# Patient Record
Sex: FEMALE | Race: WHITE | Employment: UNEMPLOYED | URBAN - METROPOLITAN AREA
[De-identification: names, ages, dates, MRNs, and addresses within clinical notes are randomized per-mention and may not be internally consistent; named-entity substitution may affect disease eponyms.]

---

## 2018-10-10 PROCEDURE — 88175 CYTOPATH C/V AUTO FLUID REDO: CPT | Performed by: OBSTETRICS & GYNECOLOGY

## 2018-12-17 PROBLEM — O10.919 CHRONIC HYPERTENSION DURING PREGNANCY, ANTEPARTUM: Status: ACTIVE | Noted: 2017-08-03

## 2018-12-17 PROBLEM — Z28.3 MATERNAL VARICELLA, NON-IMMUNE: Status: ACTIVE | Noted: 2017-01-05

## 2018-12-17 PROBLEM — E66.9 OBESITY: Status: ACTIVE | Noted: 2017-01-05

## 2018-12-17 PROBLEM — O09.899 MATERNAL VARICELLA, NON-IMMUNE: Status: ACTIVE | Noted: 2017-01-05

## 2019-01-02 PROBLEM — O34.219 MATERNAL CARE FOR SCAR FROM PREVIOUS CESAREAN DELIVERY, UNSPECIFIED PRIOR CESAREAN DELIVERY TYPE: Status: ACTIVE | Noted: 2019-01-02

## 2019-01-02 PROCEDURE — 88175 CYTOPATH C/V AUTO FLUID REDO: CPT | Performed by: OBSTETRICS & GYNECOLOGY

## 2019-01-03 PROCEDURE — 86762 RUBELLA ANTIBODY: CPT | Performed by: OBSTETRICS & GYNECOLOGY

## 2019-01-03 PROCEDURE — 86901 BLOOD TYPING SEROLOGIC RH(D): CPT | Performed by: OBSTETRICS & GYNECOLOGY

## 2019-01-03 PROCEDURE — 86780 TREPONEMA PALLIDUM: CPT | Performed by: OBSTETRICS & GYNECOLOGY

## 2019-01-03 PROCEDURE — 86850 RBC ANTIBODY SCREEN: CPT | Performed by: OBSTETRICS & GYNECOLOGY

## 2019-01-03 PROCEDURE — 86900 BLOOD TYPING SEROLOGIC ABO: CPT | Performed by: OBSTETRICS & GYNECOLOGY

## 2019-01-03 PROCEDURE — 85305 CLOT INHIBIT PROT S TOTAL: CPT | Performed by: OBSTETRICS & GYNECOLOGY

## 2019-01-03 PROCEDURE — 87389 HIV-1 AG W/HIV-1&-2 AB AG IA: CPT | Performed by: OBSTETRICS & GYNECOLOGY

## 2019-01-03 PROCEDURE — 85306 CLOT INHIBIT PROT S FREE: CPT | Performed by: OBSTETRICS & GYNECOLOGY

## 2019-01-07 NOTE — PROGRESS NOTES
Outpatient Maternal-Fetal Medicine Consultation    Dear Dr. Hue Benoit,    Thank you for requesting ultrasound evaluation and maternal fetal medicine consultation on your patient Shannen Benson.   As you are aware she is a 34year old female with a [place] preg mouth., Disp: , Rfl:   •  Labetalol HCl 100 MG Oral Tab, Take 1 tablet (100 mg total) by mouth 3 (three) times daily. , Disp: 90 tablet, Rfl: 3  •  Albuterol Sulfate  (90 Base) MCG/ACT Inhalation Aero Soln, Inhale 2 puffs into the lungs. , Disp: , Rfl Currently non-invasive pregnancy testing (NIPT) offers the highest detection rate (with the lowest false positive rate) for the detection of fetal aneuploidy amongst high-risk patients.  The limitations of detailed mid-trimester sonography was reviewed with resistance. Due to its strong association with obesity in the general population, type 2 diabetes mellitus is one of the two most common medical complications of the obese .  The increased risk of type 2 diabetes is primarily relate may increase with increasing maternal weight. The risk of neural tube defects increased significantly with maternal weight. The analysis found that overweight and obese pregnant women experienced significantly more stillbirths than normal weight women. 24 hours after delivery. She did not need to go home on labetalol after delivery. She was currently started on labetalol at 8 weeks gestation due to elevated blood pressure.   She now has a diagnosis of chronic hypertension.     Background  Preeclampsia approximately one-third developed the disease at ? 27 weeks, one-third at 28 to 36 weeks, and one-third at ? 37 weeks. Thus, 21 percent of subsequent pregnancies were complicated by severe preeclampsia in the second trimester.   Recurrent preeclampsia is more preeclampsia.  Measurement of angiogenic factors (VEGF, PlGF, sFlt-1, Kandace) in blood or urine is the most promising approach for predicting preeclampsia; however, these tests are investigational and are not available for clinical use at present.           maintaining vascular wall prostacyclin synthesis. Although not well studied, the beneficial effect of low-dose aspirin for prevention of preeclampsia may also be in part related to modulation of inflammation.  The drug has been studied for both prevention o development of preeclampsia vary widely and lack consistency. USPSTF criteria for moderate risk include:  · Nulliparity  · Obesity (body mass index >30 kg/m2)  · Family history of preeclampsia in mother or sister  · Age ? 35 years  · Sociodemographic charac clinical disease is apparent. However, available evidence is inconsistent about the importance of early initiation of therapy, possibly because aspirin has major effects on prostacyclin production and endothelial function throughout gestation.   Aspirin is preeclampsia of at least 8 percent: Women with ?1 high-risk factors should receive low-dose aspirin.  For women with multiple moderate risk factors, the benefit of aspirin therapy is less clear so clinicians should use clinical judgement and talk with their or two or more lower risk factors.       HYPERTENSION  Patient Review  The patient has a history of hypertension since December 2018.   She is controlled on Labetalol 100 mg TID.    Discussion:  We discussed the potential implications associated with chroni as well as recommendations for serial growth ultrasounds and antepartum testing.       IMPRESSION:  · IUP at 11w5d  · Morbid obesity complicating pregnancy  · Chronic hypertension  · H/o Preeclampisa  · H/o   · Family h/o protein S deficiency; her

## 2019-01-09 ENCOUNTER — OFFICE VISIT (OUTPATIENT)
Dept: PERINATAL CARE | Facility: HOSPITAL | Age: 34
End: 2019-01-09
Attending: OBSTETRICS & GYNECOLOGY
Payer: COMMERCIAL

## 2019-01-09 VITALS
HEART RATE: 76 BPM | WEIGHT: 293 LBS | BODY MASS INDEX: 51.91 KG/M2 | DIASTOLIC BLOOD PRESSURE: 81 MMHG | SYSTOLIC BLOOD PRESSURE: 136 MMHG | HEIGHT: 63 IN

## 2019-01-09 DIAGNOSIS — O09.299 HX OF PREECLAMPSIA, PRIOR PREGNANCY, CURRENTLY PREGNANT: ICD-10-CM

## 2019-01-09 DIAGNOSIS — J45.30 MILD PERSISTENT ASTHMA WITHOUT COMPLICATION: ICD-10-CM

## 2019-01-09 DIAGNOSIS — O10.919 CHRONIC HYPERTENSION DURING PREGNANCY, ANTEPARTUM: ICD-10-CM

## 2019-01-09 DIAGNOSIS — E66.01 MORBID OBESITY WITH BMI OF 50.0-59.9, ADULT (HCC): ICD-10-CM

## 2019-01-09 DIAGNOSIS — O09.621 HIGH-RISK PREGNANCY, YOUNG MULTIGRAVIDA IN FIRST TRIMESTER: Primary | ICD-10-CM

## 2019-01-09 DIAGNOSIS — O34.219 MATERNAL CARE FOR SCAR FROM PREVIOUS CESAREAN DELIVERY, UNSPECIFIED PRIOR CESAREAN DELIVERY TYPE: ICD-10-CM

## 2019-01-09 PROCEDURE — 99244 OFF/OP CNSLTJ NEW/EST MOD 40: CPT | Performed by: OBSTETRICS & GYNECOLOGY

## 2019-01-12 PROBLEM — R87.612 PAPANICOLAOU SMEAR OF CERVIX WITH LOW GRADE SQUAMOUS INTRAEPITHELIAL LESION (LGSIL): Status: ACTIVE | Noted: 2019-01-12

## 2019-01-12 PROCEDURE — 82105 ALPHA-FETOPROTEIN SERUM: CPT | Performed by: OBSTETRICS & GYNECOLOGY

## 2019-01-12 PROCEDURE — 84156 ASSAY OF PROTEIN URINE: CPT | Performed by: OBSTETRICS & GYNECOLOGY

## 2019-01-12 PROCEDURE — 82570 ASSAY OF URINE CREATININE: CPT | Performed by: OBSTETRICS & GYNECOLOGY

## 2019-03-28 PROBLEM — O26.899 PELVIC CRAMPING IN ANTEPARTUM PERIOD: Status: ACTIVE | Noted: 2019-03-28

## 2019-03-28 PROBLEM — R10.2 PELVIC CRAMPING IN ANTEPARTUM PERIOD: Status: ACTIVE | Noted: 2019-03-28

## 2019-07-02 PROCEDURE — 87653 STREP B DNA AMP PROBE: CPT | Performed by: OBSTETRICS & GYNECOLOGY

## 2019-07-02 PROCEDURE — 87081 CULTURE SCREEN ONLY: CPT | Performed by: OBSTETRICS & GYNECOLOGY

## 2019-07-16 PROBLEM — E66.01 MORBID OBESITY WITH BMI OF 50.0-59.9, ADULT (HCC): Status: RESOLVED | Noted: 2019-01-09 | Resolved: 2019-07-08
